# Patient Record
Sex: FEMALE | Race: WHITE
[De-identification: names, ages, dates, MRNs, and addresses within clinical notes are randomized per-mention and may not be internally consistent; named-entity substitution may affect disease eponyms.]

---

## 2019-04-03 ENCOUNTER — HOSPITAL ENCOUNTER (OUTPATIENT)
Dept: HOSPITAL 11 - JP.ACU | Age: 74
End: 2019-04-03
Attending: INTERNAL MEDICINE
Payer: MEDICARE

## 2019-04-03 VITALS — DIASTOLIC BLOOD PRESSURE: 68 MMHG | HEART RATE: 77 BPM | SYSTOLIC BLOOD PRESSURE: 157 MMHG

## 2019-04-03 DIAGNOSIS — E11.9: ICD-10-CM

## 2019-04-03 DIAGNOSIS — R07.89: Primary | ICD-10-CM

## 2019-04-03 DIAGNOSIS — R06.00: ICD-10-CM

## 2019-04-03 PROCEDURE — 78452 HT MUSCLE IMAGE SPECT MULT: CPT

## 2019-04-03 PROCEDURE — 93018 CV STRESS TEST I&R ONLY: CPT

## 2019-04-03 PROCEDURE — A9500 TC99M SESTAMIBI: HCPCS

## 2019-04-03 PROCEDURE — 93017 CV STRESS TEST TRACING ONLY: CPT

## 2019-04-03 NOTE — STRESS
DATE OF SERVICE:  04/03/2019

 

PROCEDURE PERFORMED:  Lexiscan Cardiolite study.

 

TECHNIQUE:  Ms. Lockwood was infused with usual dose of Lexiscan followed by the Cardiolite

injection given per protocol.  She did develop symptoms of mild chest pressure and shortness

of breath.  These symptoms resolved spontaneously and did not require reversal medication.

 

Resting ECG:  Sinus rhythm, rate of 75, normal axis and intervals, early transition noted in

the precordial leads with J-point elevation in the anterior leads.  Similar findings were

seen on the post hyperventilation and standing ECGs.  No significant ST-segment changes or T-

wave abnormalities were seen with Lexiscan infusion or during the post infusion.  No

significant dysrhythmias were noted during the monitoring.  She did experience symptoms as

reported above.

 

IMPRESSION:  Unremarkable Lexiscan portion of Lexiscan Cardiolite study.  Interpretation of

the Cardiolite portion of the study is pending at this time.

 

 

 

 

Pierre Harrison MD

DD:  04/03/2019 10:45:31

DT:  04/03/2019 11:10:06

Job #:  [id number]

## 2019-04-08 NOTE — NM
Myocardial Perf Spect Multi

 

INDICATION: CHEST PAIN

 

COMPARISON:   2012

 

TECHNIQUE:  Nuclear medicine myocardial perfusion scan was performed after IV

administration of 9.6 millicuries uptake technetium 99m Myoview at rest and 30.5

millicuries of technetium 99m at stress  (Lexiscan).

 

FINDINGS:

Myocardial perfusion:   There is a inferior wall perfusion defect on the stress

images. The this is a diminishes on the rest images with some persistent defect

in the inferior lateral wall towards the apex

Wall motion:   Normal wall motion.

 

LVEF stress:   78 %

            rest:      72%

 

Other findings:   None.

 

IMPRESSION:    Perfusion defect in the inferior wall suggests reversible

ischemia

 

Small fixed defect in the inferior lateral wall towards the apex may represent a

small focus of previous infarct